# Patient Record
Sex: MALE | Race: WHITE | URBAN - METROPOLITAN AREA
[De-identification: names, ages, dates, MRNs, and addresses within clinical notes are randomized per-mention and may not be internally consistent; named-entity substitution may affect disease eponyms.]

---

## 2021-05-26 ENCOUNTER — RECORDS - HEALTHEAST (OUTPATIENT)
Dept: ADMINISTRATIVE | Facility: CLINIC | Age: 41
End: 2021-05-26

## 2025-03-06 ENCOUNTER — OFFICE VISIT (OUTPATIENT)
Dept: FAMILY MEDICINE | Facility: CLINIC | Age: 45
End: 2025-03-06
Payer: COMMERCIAL

## 2025-03-06 VITALS
RESPIRATION RATE: 20 BRPM | SYSTOLIC BLOOD PRESSURE: 120 MMHG | DIASTOLIC BLOOD PRESSURE: 78 MMHG | BODY MASS INDEX: 27.57 KG/M2 | HEART RATE: 78 BPM | WEIGHT: 192.6 LBS | OXYGEN SATURATION: 98 % | TEMPERATURE: 97.9 F | HEIGHT: 70 IN

## 2025-03-06 DIAGNOSIS — N40.1 BENIGN PROSTATIC HYPERPLASIA WITH URINARY FREQUENCY: ICD-10-CM

## 2025-03-06 DIAGNOSIS — Z13.1 SCREENING FOR DIABETES MELLITUS: ICD-10-CM

## 2025-03-06 DIAGNOSIS — Z00.00 ROUTINE GENERAL MEDICAL EXAMINATION AT A HEALTH CARE FACILITY: Primary | ICD-10-CM

## 2025-03-06 DIAGNOSIS — Z11.59 NEED FOR HEPATITIS B SCREENING TEST: ICD-10-CM

## 2025-03-06 DIAGNOSIS — Z23 NEED FOR VACCINATION: ICD-10-CM

## 2025-03-06 DIAGNOSIS — R35.0 BENIGN PROSTATIC HYPERPLASIA WITH URINARY FREQUENCY: ICD-10-CM

## 2025-03-06 DIAGNOSIS — Z11.59 NEED FOR HEPATITIS C SCREENING TEST: ICD-10-CM

## 2025-03-06 DIAGNOSIS — Z13.220 LIPID SCREENING: ICD-10-CM

## 2025-03-06 LAB
ALBUMIN SERPL BCG-MCNC: 4.5 G/DL (ref 3.5–5.2)
ALP SERPL-CCNC: 46 U/L (ref 40–150)
ALT SERPL W P-5'-P-CCNC: 23 U/L (ref 0–70)
ANION GAP SERPL CALCULATED.3IONS-SCNC: 10 MMOL/L (ref 7–15)
AST SERPL W P-5'-P-CCNC: 25 U/L (ref 0–45)
BILIRUB SERPL-MCNC: 0.4 MG/DL
BUN SERPL-MCNC: 13.1 MG/DL (ref 6–20)
CALCIUM SERPL-MCNC: 9.5 MG/DL (ref 8.8–10.4)
CHLORIDE SERPL-SCNC: 103 MMOL/L (ref 98–107)
CHOLEST SERPL-MCNC: 330 MG/DL
CREAT SERPL-MCNC: 0.97 MG/DL (ref 0.67–1.17)
EGFRCR SERPLBLD CKD-EPI 2021: >90 ML/MIN/1.73M2
ERYTHROCYTE [DISTWIDTH] IN BLOOD BY AUTOMATED COUNT: 12.8 % (ref 10–15)
EST. AVERAGE GLUCOSE BLD GHB EST-MCNC: 105 MG/DL
FASTING STATUS PATIENT QL REPORTED: YES
FASTING STATUS PATIENT QL REPORTED: YES
GLUCOSE SERPL-MCNC: 100 MG/DL (ref 70–99)
HBA1C MFR BLD: 5.3 % (ref 0–5.6)
HBV SURFACE AB SERPL IA-ACNC: <3.5 M[IU]/ML
HBV SURFACE AB SERPL IA-ACNC: NONREACTIVE M[IU]/ML
HCO3 SERPL-SCNC: 27 MMOL/L (ref 22–29)
HCT VFR BLD AUTO: 43.4 % (ref 40–53)
HDLC SERPL-MCNC: 43 MG/DL
HGB BLD-MCNC: 14.8 G/DL (ref 13.3–17.7)
LDLC SERPL CALC-MCNC: 269 MG/DL
MCH RBC QN AUTO: 30.8 PG (ref 26.5–33)
MCHC RBC AUTO-ENTMCNC: 34.1 G/DL (ref 31.5–36.5)
MCV RBC AUTO: 90 FL (ref 78–100)
NONHDLC SERPL-MCNC: 287 MG/DL
PLATELET # BLD AUTO: 284 10E3/UL (ref 150–450)
POTASSIUM SERPL-SCNC: 4 MMOL/L (ref 3.4–5.3)
PROT SERPL-MCNC: 7.1 G/DL (ref 6.4–8.3)
RBC # BLD AUTO: 4.8 10E6/UL (ref 4.4–5.9)
SODIUM SERPL-SCNC: 140 MMOL/L (ref 135–145)
TRIGL SERPL-MCNC: 88 MG/DL
WBC # BLD AUTO: 6.5 10E3/UL (ref 4–11)

## 2025-03-06 RX ORDER — DOXAZOSIN 4 MG/1
TABLET ORAL
COMMUNITY
Start: 2025-03-05

## 2025-03-06 SDOH — HEALTH STABILITY: PHYSICAL HEALTH: ON AVERAGE, HOW MANY DAYS PER WEEK DO YOU ENGAGE IN MODERATE TO STRENUOUS EXERCISE (LIKE A BRISK WALK)?: 5 DAYS

## 2025-03-06 SDOH — HEALTH STABILITY: PHYSICAL HEALTH: ON AVERAGE, HOW MANY MINUTES DO YOU ENGAGE IN EXERCISE AT THIS LEVEL?: 80 MIN

## 2025-03-06 ASSESSMENT — PAIN SCALES - GENERAL: PAINLEVEL_OUTOF10: NO PAIN (0)

## 2025-03-06 ASSESSMENT — SOCIAL DETERMINANTS OF HEALTH (SDOH): HOW OFTEN DO YOU GET TOGETHER WITH FRIENDS OR RELATIVES?: THREE TIMES A WEEK

## 2025-03-06 NOTE — PROGRESS NOTES
"Preventive Care Visit  [facility]  Dwaine Peters MD, Family Medicine  Mar 6, 2025      Assessment & Plan     Routine general medical examination at a health care facility  - CBC with platelets; Future  - Comprehensive metabolic panel (BMP + Alb, Alk Phos, ALT, AST, Total. Bili, TP); Future  - CBC with platelets  - Comprehensive metabolic panel (BMP + Alb, Alk Phos, ALT, AST, Total. Bili, TP)    Benign prostatic hyperplasia with urinary frequency  - followed by urology     Need for hepatitis C screening test  - Hepatitis C Screen Reflex to HCV RNA Quant and Genotype; Future  - Hepatitis C Screen Reflex to HCV RNA Quant and Genotype    Need for hepatitis B screening test  - Hepatitis B surface antigen; Future  - Hepatitis B Surface Antibody; Future  - Hepatitis B core antibody; Future  - Hepatitis B surface antigen  - Hepatitis B Surface Antibody  - Hepatitis B core antibody    Lipid screening  - Lipid panel reflex to direct LDL Fasting; Future  - Lipid panel reflex to direct LDL Fasting    Screening for diabetes mellitus  - Hemoglobin A1c; Future  - Hemoglobin A1c    Need for vaccination  - TDAP 10-64Y (ADACEL,BOOSTRIX)            BMI  Estimated body mass index is 27.27 kg/m  as calculated from the following:    Height as of this encounter: 1.79 m (5' 10.47\").    Weight as of this encounter: 87.4 kg (192 lb 9.6 oz).       Counseling  Appropriate preventive services were addressed with this patient via screening, questionnaire, or discussion as appropriate for fall prevention, nutrition, physical activity, Tobacco-use cessation, social engagement, weight loss and cognition.  Checklist reviewing preventive services available has been given to the patient.  Reviewed patient's diet, addressing concerns and/or questions.           Dc Franco is a 44 year old, presenting for the following:  Physical, Blood Draw, and Fatigue        3/6/2025     2:17 PM   Additional Questions "   Roomed by Verna   Accompanied by alone         3/6/2025     2:17 PM   Patient Reported Additional Medications   Patient reports taking the following new medications none          HPI    Advance Care Planning  Patient does not have a Health Care Directive: Discussed advance care planning with patient; information given to patient to review.      3/6/2025   General Health   How would you rate your overall physical health? Excellent   Feel stress (tense, anxious, or unable to sleep) Only a little   (!) STRESS CONCERN      3/6/2025   Nutrition   Three or more servings of calcium each day? Yes   Diet: Regular (no restrictions)   How many servings of fruit and vegetables per day? (!) 2-3   How many sweetened beverages each day? 0-1         3/6/2025   Exercise   Days per week of moderate/strenous exercise 5 days   Average minutes spent exercising at this level 80 min         3/6/2025   Social Factors   Frequency of gathering with friends or relatives Three times a week   Worry food won't last until get money to buy more No   Food not last or not have enough money for food? No   Do you have housing? (Housing is defined as stable permanent housing and does not include staying ouside in a car, in a tent, in an abandoned building, in an overnight shelter, or couch-surfing.) Yes   Are you worried about losing your housing? No   Lack of transportation? No   Unable to get utilities (heat,electricity)? No         3/6/2025   Dental   Dentist two times every year? Yes            Today's PHQ-2 Score:       3/6/2025     2:08 PM   PHQ-2 ( 1999 [employer])   Q1: Little interest or pleasure in doing things 1   Q2: Feeling down, depressed or hopeless 1   PHQ-2 Score 2    Q1: Little interest or pleasure in doing things Several days   Q2: Feeling down, depressed or hopeless Several days   PHQ-2 Score 2       Patient-reported           3/6/2025   Substance Use   Alcohol more than 3/day or more than 7/wk Not Applicable   Do you use any other  "substances recreationally? No     Social History     Tobacco Use    Smoking status: Never    Smokeless tobacco: Never   Vaping Use    Vaping status: Never Used             3/6/2025   One time HIV Screening   Previous HIV test? Yes         3/6/2025   STI Screening   New sexual partner(s) since last STI/HIV test? No   ASCVD Risk   The ASCVD Risk score (Viviana DICK, et al., 2019) failed to calculate for the following reasons:    The systolic blood pressure is missing    Cannot find a previous HDL lab    Cannot find a previous total cholesterol lab    The BP treatment status is invalid        3/6/2025   Contraception/Family Planning   Questions about contraception or family planning No        Reviewed and updated as needed this visit by Provider                             Objective    Exam  There were no vitals taken for this visit.   There is no height or weight on file to calculate BMI.    Physical Exam  /78 (BP Location: Right arm, Patient Position: Sitting, Cuff Size: Adult Regular)   Pulse 78   Temp 97.9  F (36.6  C) (Temporal)   Resp 20   Ht 1.79 m (5' 10.47\")   Wt 87.4 kg (192 lb 9.6 oz)   SpO2 98%   BMI 27.27 kg/m             Signed Electronically by: Dwaine Peters MD    "

## 2025-03-06 NOTE — NURSING NOTE
Prior to immunization administration, verified patients identity using patient s name and date of birth. Please see Immunization Activity for additional information.     Screening Questionnaire for Adult Immunization    Are you sick today?   No   Do you have allergies to medications, food, a vaccine component or latex?   No   Have you ever had a serious reaction after receiving a vaccination?   No   Do you have a long-term health problem with heart, lung, kidney, or metabolic disease (e.g., diabetes), asthma, a blood disorder, no spleen, complement component deficiency, a cochlear implant, or a spinal fluid leak?  Are you on long-term aspirin therapy?   No   Do you have cancer, leukemia, HIV/AIDS, or any other immune system problem?   No   Do you have a parent, brother, or sister with an immune system problem?   No   In the past 3 months, have you taken medications that affect  your immune system, such as prednisone, other steroids, or anticancer drugs; drugs for the treatment of rheumatoid arthritis, Crohn s disease, or psoriasis; or have you had radiation treatments?   No   Have you had a seizure, or a brain or other nervous system problem?   No   During the past year, have you received a transfusion of blood or blood    products, or been given immune (gamma) globulin or antiviral drug?   No   For women: Are you pregnant or is there a chance you could become       pregnant during the next month?   No   Have you received any vaccinations in the past 4 weeks?   No     Immunization questionnaire answers were all negative.      Patient instructed to remain in clinic for 15 minutes afterwards, and to report any adverse reactions.     Screening performed by Verna Bautista MA on 3/6/2025 at 3:15 PM.

## 2025-03-06 NOTE — PATIENT INSTRUCTIONS
[facility] providers accepting new patients -   Dr. Pamela Marks, DAMARIS Little, P[facility]       Patient Education   Preventive Care Advice   This is general advice given by our system to help you stay healthy. However, your care team may have specific advice just for you. Please talk to your care team about your preventive care needs.  Nutrition  Eat 5 or more servings of fruits and vegetables each day.  Try wheat bread, brown rice and whole grain pasta (instead of white bread, rice, and pasta).  Get enough calcium and vitamin D. Check the label on foods and aim for 100% of the RDA (recommended daily allowance).  Lifestyle  Exercise at least 150 minutes each week  (30 minutes a day, 5 days a week).  Do muscle strengthening activities 2 days a week. These help control your weight and prevent disease.  No smoking.  Wear sunscreen to prevent skin cancer.  Have a dental exam and cleaning every 6 months.  Yearly exams  See your health care team every year to talk about:  Any changes in your health.  Any medicines your care team has prescribed.  Preventive care, family planning, and ways to prevent chronic diseases.  Shots (vaccines)   HPV shots (up to age 26), if you've never had them before.  Hepatitis B shots (up to age 59), if you've never had them before.  COVID-19 shot: Get this shot when it's due.  Flu shot: Get a flu shot every year.  Tetanus shot: Get a tetanus shot every 10 years.  Pneumococcal, hepatitis A, and RSV shots: Ask your care team if you need these based on your risk.  Shingles shot (for age 50 and up)  General health tests  Diabetes screening:  Starting at age 35, Get screened for diabetes at least every 3 years.  If you are younger than age 35, ask your care team if you should be screened for diabetes.  Cholesterol test: At age 39, start having a cholesterol test every 5 years, or more often if advised.  Bone density scan (DEXA): At age 50, ask  your care team if you should have this scan for osteoporosis (brittle bones).  Hepatitis C: Get tested at least once in your life.  STIs (sexually transmitted infections)  Before age 24: Ask your care team if you should be screened for STIs.  After age 24: Get screened for STIs if you're at risk. You are at risk for STIs (including HIV) if:  You are sexually active with more than one person.  You don't use condoms every time.  You or a partner was diagnosed with a sexually transmitted infection.  If you are at risk for HIV, ask about PrEP medicine to prevent HIV.  Get tested for HIV at least once in your life, whether you are at risk for HIV or not.  Cancer screening tests  Cervical cancer screening: If you have a cervix, begin getting regular cervical cancer screening tests starting at age 21.  Breast cancer scan (mammogram): If you've ever had breasts, begin having regular mammograms starting at age 40. This is a scan to check for breast cancer.  Colon cancer screening: It is important to start screening for colon cancer at age 45.  Have a colonoscopy test every 10 years (or more often if you're at risk) Or, ask your provider about stool tests like a FIT test every year or Cologuard test every 3 years.  To learn more about your testing options, visit:   .  For help making a decision, visit:   https://bit.ly/jg70819.  Prostate cancer screening test: If you have a prostate, ask your care team if a prostate cancer screening test (PSA) at age 55 is right for you.  Lung cancer screening: If you are a current or former smoker ages 50 to 80, ask your care team if ongoing lung cancer screenings are right for you.  For informational purposes only. Not to replace the advice of your health care provider. Copyright   2023 [employer]. All rights reserved. Clinically reviewed by the [facility]. SiSaf [id number] - REV 01/24.